# Patient Record
Sex: MALE | Race: WHITE
[De-identification: names, ages, dates, MRNs, and addresses within clinical notes are randomized per-mention and may not be internally consistent; named-entity substitution may affect disease eponyms.]

---

## 2019-01-11 ENCOUNTER — HOSPITAL ENCOUNTER (OUTPATIENT)
Dept: HOSPITAL 39 - RESP | Age: 69
End: 2019-01-11
Attending: ORTHOPAEDIC SURGERY
Payer: MEDICARE

## 2019-01-11 DIAGNOSIS — Z01.818: Primary | ICD-10-CM

## 2019-01-11 DIAGNOSIS — M17.12: ICD-10-CM

## 2019-01-15 NOTE — RAD
EXAM DESCRIPTION: 



Knee,Left Complete



CLINICAL HISTORY: 



OSTEOARTHRITIS



COMPARISON: 



None.



Findings:



4 standing views of the left knee show severe narrowing of the

medial tibiofemoral compartment with mild joint line osteophytes.

Flattening and sclerotic changes to the weightbearing articular

surface are seen. Mild lateral positioning of the tibia plateau

relative to the femoral condyles seen.

Mild posterior osteophytes of the patella are seen with mild

narrowing of the patellofemoral compartment. Mild increased

density in the suprapatellar bursa is seen. Vascular

calcifications are identified. Surgical clips in the medial soft

tissue from previous vein graft harvest are noted.



IMPRESSION: 



3 compartment osteoarthritic changes of the left knee are seen

with severe advanced osteoarthritic changes of the medial

tibiofemoral compartment.



Small left knee joint effusion.



Electronically signed by:  Lul Drew MD  1/15/2019 1:56 PM CST

Workstation: [id number]

## 2019-01-21 NOTE — HP
CHIEF COMPLAINT:  Left knee pain.



HISTORY OF PRESENT ILLNESS:  Mr. Ocasio is a 68-year-old male with a history of 
severe bilateral knee pain.  He has had this going on for years and it is 
getting progressively worse.  He has failed conservative measures and has had 
contralateral knee replacement.  He is requesting operative intervention.  After
discussing the risks, benefits and alternatives to operative therapy, he has 
given informed consent.  



PAST SURGICAL HISTORY:  

1.  Cardiac bypass.

2.  Total knee replacement.

3.  Umbilical herniorrhaphy.



MEDICATIONS:  

1.  Lasix.

2.  Potassium chloride.

3.  Carvedilol.

4.  Metolazone.

5.  Tylenol.



ALLERGIES:  NO KNOWN DRUG ALLERGIES.



CODE STATUS:  Full code.



IMMUNIZATIONS:  Up to date.



FAMILY HISTORY:  None pertinent to today's complaint.



SOCIAL HISTORY:  The patient does not smoke or use any illicit drugs.  He does 
drink on occasion.



REVIEW OF SYSTEMS:  Negative except as indicated in the History of Present 
Illness.



PHYSICAL EXAMINATION:



VITAL SIGNS:  Blood pressure 131/83.  Pulse 70.  Height 5'6".  Weight 204 
pounds.



MENTAL STATUS:  The patient is awake, alert, and is able to give a good history 
and participate in the physical.  The patient is oriented to person, place and 
time.



SKIN:  Normal tone and turgor.



HEENT:  Normocephalic, atraumatic.  Pupils equal, round and reactive.  Mucosal 
membranes are moist.



NECK:  Normal range of motion.  No thyromegaly, no lymphadenopathy. 



CHEST:  Normal respiratory excursion.



CARDIAC:  Regular rate and rhythm.  No murmurs, rubs or gallops.



MUSCULOSKELETAL:  He has full range of motion of bilateral upper extremities.  
He has intact sensation throughout the extremities.  There is no deformity and 
no crepitus with range of motion.  Sensation is intact.  They are warm and well 
perfused.  The right lower extremity shows full range of motion of the hip.  
Range of motion of the knee is from 0 to about 125.  Sensation is intact.  It is
warm and well perfused.  There is no varus/valgus or anterior/posterior laxity. 
The left lower extremity shows full range of motion in the hip.  He has range of
motion from 0 to about 125 degrees of the knee.  He has slight varus deformity 
of the knee.  Sensation is intact.  Strength is 5/5.  The extremity is warm and 
well perfused.  He has crepitus throughout his range of motion and tenderness 
diffusely to palpation.  



IMAGING:  X-rays show severe osteoarthritis with varus deformity.



ASSESSMENT:

1.  Osteoarthritis.



PLAN: The plan at this point is total knee arthroplasty.  We have discussed the 
risks, benefits, and alternatives to that and the patient has given informed 
consent.



#[id number]

[facility]

## 2019-01-22 ENCOUNTER — HOSPITAL ENCOUNTER (INPATIENT)
Dept: HOSPITAL 39 - AMB | Age: 69
LOS: 1 days | Discharge: HOME | DRG: 470 | End: 2019-01-23
Attending: NURSE PRACTITIONER | Admitting: ORTHOPAEDIC SURGERY
Payer: MEDICARE

## 2019-01-22 DIAGNOSIS — I25.10: ICD-10-CM

## 2019-01-22 DIAGNOSIS — I27.20: ICD-10-CM

## 2019-01-22 DIAGNOSIS — Z96.651: ICD-10-CM

## 2019-01-22 DIAGNOSIS — M17.12: Primary | ICD-10-CM

## 2019-01-22 DIAGNOSIS — Z95.1: ICD-10-CM

## 2019-01-22 DIAGNOSIS — I50.22: ICD-10-CM

## 2019-01-22 DIAGNOSIS — Z88.5: ICD-10-CM

## 2019-01-22 PROCEDURE — 0SRD0J9 REPLACEMENT OF LEFT KNEE JOINT WITH SYNTHETIC SUBSTITUTE, CEMENTED, OPEN APPROACH: ICD-10-PCS | Performed by: ORTHOPAEDIC SURGERY

## 2019-01-22 RX ADMIN — BUPIVACAINE HYDROCHLORIDE ONE ML: 5 INJECTION, SOLUTION EPIDURAL; INTRACAUDAL at 11:18

## 2019-01-22 RX ADMIN — BUPIVACAINE ONE MG: 13.3 INJECTION, SUSPENSION, LIPOSOMAL INFILTRATION at 11:18

## 2019-01-22 RX ADMIN — ENOXAPARIN SODIUM SCH MG: 30 INJECTION, SOLUTION INTRAVENOUS; SUBCUTANEOUS at 23:04

## 2019-01-22 RX ADMIN — CELECOXIB SCH MG: 100 CAPSULE ORAL at 17:59

## 2019-01-22 RX ADMIN — BUPIVACAINE HYDROCHLORIDE ONE ML: 5 INJECTION, SOLUTION EPIDURAL; INTRACAUDAL at 12:15

## 2019-01-22 RX ADMIN — VANCOMYCIN HYDROCHLORIDE ONE MG: 500 INJECTION, POWDER, LYOPHILIZED, FOR SOLUTION INTRAVENOUS at 12:15

## 2019-01-22 RX ADMIN — VANCOMYCIN HYDROCHLORIDE SCH MLS/HR: 500 INJECTION, POWDER, LYOPHILIZED, FOR SOLUTION INTRAVENOUS at 20:33

## 2019-01-22 RX ADMIN — CEFAZOLIN SODIUM SCH MLS/HR: 2 SOLUTION INTRAVENOUS at 18:15

## 2019-01-22 RX ADMIN — BUPIVACAINE ONE MG: 13.3 INJECTION, SUSPENSION, LIPOSOMAL INFILTRATION at 12:15

## 2019-01-22 RX ADMIN — VANCOMYCIN HYDROCHLORIDE ONE MG: 500 INJECTION, POWDER, LYOPHILIZED, FOR SOLUTION INTRAVENOUS at 11:19

## 2019-01-22 NOTE — RAD
EXAM DESCRIPTION: Knee,Left 2 or More Views



CLINICAL HISTORY: 68 years Male, TKA



TECHNIQUE:   2  views of the left knee were performed.



COMPARISON: None available.



FINDINGS: The visualized bones appear well mineralized. No acute

fracture or dislocation. Changes of left total knee

arthroplasty.. Soft tissue swelling and subcutaneous venous

emphysema is noted.



IMPRESSION:

Changes of left total knee arthroplasty with expected

postsurgical changes and the surrounding soft tissues.



Electronically signed by:  Joe Gonzalez MD  1/22/2019

3:57 PM [facility] Workstation: [id number]

## 2019-01-22 NOTE — CONS
DATE OF CONSULTATION:  01/22/19



SUPERVISING PHYSICIAN:  Gerard Simeon M.D. 



HISTORY OF PRESENT ILLNESS:  Mr. Ocasio is a 68 year-old male  patient 
that was admitted today for an elective left knee total arthroplasty by Dr. Jese Perez.  He has a long history of severe bilateral knee pain going on for 
multiple years that had progressively worsened.  He failed to have any relief of
conservative measures and has had a contralateral knee replacement.  He 
requested operative intervention for symptom control performed by Dr. Jese Perez.  He had no intraoperative complications and was seen in the immediate 
postoperative state in stable condition.  He was alert.



PAST MEDICAL HISTORY: 

1.   Coronary artery disease status post coronary artery bypass surgery.

2.   Compensated congestive heart failure, systolic with no current 
echocardiogram 

      available for review at time of admission.

3.   Pulmonary hypertension.



PAST SURGICAL HISTORY:  

1.   Cardiac bypass.

2.   Total right knee replacement.

3.   Umbilical hernia repair.

4.   Bilateral cataract extraction.



HOME MEDICATIONS: 

1.   Ibuprofen 800 mg daily.

2.   Metolazone 5 mg as needed for swelling.

3.   Aspirin 81 mg weekly.

4.   Coreg 3.125 mg at bedtime.

5.   Lasix 20 mg at bedtime.

6.   Lasix 40 mg in the morning.

7.   Potassium chloride 10 mEq daily.



ALLERGIES:   HYDROCODONE.



FAMILY HISTORY:  Noncontributory.



SOCIAL HISTORY:  The patient lives in [place], [place] and he is .  He is 
self employed.  He has no history of alcohol or tobacco abuse. 



REVIEW OF SYSTEMS: 

CONSTITUTIONAL:  Negative for any fevers, chills, general malaise, unintentional
weight loss.

HEENT:  Negative for headaches, vision changes, sore throat, nasal congestion.

CARDIOVASCULAR:  Negative for exertional dyspnea, chest pains, palpitations, 
syncopal episodes.

RESPIRATORY:  Negative for coughing, wheezing or shortness of breath.

GASTROINTESTINAL:  Negative for nausea, vomiting, diarrhea, constipation or 
abdominal pain.

GENITOURINARY:  No dysuria, hematuria or polyuria.

MUSCULOSKELETAL:  As noted in history of present illness, bilateral advanced 
arthritis and knee pain status post bilateral knee replacements.

NEUROLOGIC:  Negative for seizures, ataxia, vision changes, syncopal episodes, 
paresthesias or any neurological deficits.



PHYSICAL EXAMINATION: 



VITAL SIGNS:  Temperature 97.8, pulse 67, blood pressure 139/72, respirations 
20, satting 94% on nasal cannula at rest on 2 liters.  Admission weight is 90.7 
kg.



GENERAL:  The patient is alert, sitting in bed.  Appears to be in no acute 
distress.



HEENT:  Tympanic membranes are clear bilaterally.  Oropharynx is pink and moist 
without any lesions.



NECK:  Supple, non-tender.  Full range of motion.  No jugular venous distention.



CHEST:  Lungs are clear to auscultation bilaterally without any rhonchi, 
wheezing or rales.



HEART:  Regular rate and rhythm without appreciable murmurs, gallops, or rubs.



ABDOMEN:  Obese but soft, non-tender.  Positive bowel sounds.



EXTREMITIES:  Left knee has a bulky surgical dressing in place.  Distally pulses
are strong, capillary refill is brisk bilaterally.



NEUROLOGIC:  He is alert and oriented times three.



LABORATORY:  Postoperative labs are pending.



ASSESSMENT:

1.   Advanced osteoarthritis of the left knee failing to respond to outpatient 
treatment 

      measures status post total left knee arthroplasty for symptom control, 
immediate

      postoperative day 0.

2.   Coronary artery disease status post coronary artery bypass graft surgery.

3.   Compensated congestive heart failure, systolic with no echocardiogram 
available

      at time of admission.

4.   History of pulmonary hypertension.



PLAN:  Will follow the patient as he progresses through his postoperative phase 
and physical therapy rehabilitation efforts.  I will restart his medications as 
those have been updated and verified as appropriate to care.  He will be on DVT 
prophylaxis as per protocol.  At this point the plan is to continue with 
outpatient therapy in [place], [place] once he is able to discharge.  Will defer 
orthopedic care to Dr. Perez.  Will anticipate length of stay to be 2 to 3 days.  
Until the patient can discharge to outpatient management will continue to 
monitor and treat as needed.  Once discharged he will need followup with Dr. Perez
and his primary care physician who is Dr. Piper.



#[id number]

[facility]

## 2019-01-23 VITALS — OXYGEN SATURATION: 92 % | TEMPERATURE: 98.3 F | SYSTOLIC BLOOD PRESSURE: 142 MMHG | DIASTOLIC BLOOD PRESSURE: 65 MMHG

## 2019-01-23 RX ADMIN — CEFAZOLIN SODIUM SCH MLS/HR: 2 SOLUTION INTRAVENOUS at 11:44

## 2019-01-23 RX ADMIN — CELECOXIB SCH MG: 100 CAPSULE ORAL at 07:54

## 2019-01-23 RX ADMIN — CEFAZOLIN SODIUM SCH MLS/HR: 2 SOLUTION INTRAVENOUS at 02:06

## 2019-01-23 RX ADMIN — VANCOMYCIN HYDROCHLORIDE SCH MLS/HR: 500 INJECTION, POWDER, LYOPHILIZED, FOR SOLUTION INTRAVENOUS at 09:41

## 2019-01-23 RX ADMIN — ENOXAPARIN SODIUM SCH MG: 30 INJECTION, SOLUTION INTRAVENOUS; SUBCUTANEOUS at 11:45

## 2019-01-23 NOTE — DS
SUPERVISING PHYSICIAN:  Gerard Simeon M.D.



DISCHARGE DIAGNOSIS: 

1.   Advanced osteoarthritis of the left knee having failed to respond to 
outpatient treatment 

      measures status post total left knee arthroplasty performed per Dr. Jese Perez,

      orthopedic surgeon.  Postoperative day 1.

2.   Coronary artery disease status post coronary artery bypass graft surgery.

3.   Compensated congestive heart failure, systolic in etiology with no 
echocardiogram 

      available for review.

4.   History of pulmonary hypertension.



HISTORY OF PRESENT ILLNESS:  This is a 68 year-old male patient that lives in 
[place].  He was admitted yesterday for elective left total knee arthroplasty per 
Dr. Jese Perez, orthopedic surgeon.  He has a long history of severe bilateral 
knee pain that has been going on for multiple years.  It has progressively 
worsened.  He failed conservative measures and has had a right knee replacement 
in the past.  He was seen postoperatively after his left total knee 
arthroplasty.  He had no problems intraoperatively and was admitted to the 
Medical/Surgical floor postoperatively yesterday.



HOSPITAL COURSE:  The patient used minimal pain medications while on the 
morphine PCA.  He was transitioned over to Tramadol.  He met his goals today 
with physical therapy.  He has requested that he be discharged home.  After 
seeing Dr. Perez as per Physical Therapy it has been recommended that he can be 
discharged home today.



DISCHARGE PLAN:  The patient will be discharged home in stable condition.  He 
has a followup tomorrow with Dr. Jese Perez at 9:45 AM for dressing changes.  
He will be at [place] outpatient physical therapy.  He is to resume his previous 
diet as well as his previous medications.  He will have Tramadol for pain as 
well as 10 more days of Xarelto starting tomorrow.  He is to return to the 
hospital or call Dr. Perez's office for any problems or complications.  



DISCHARGE MEDICATIONS:

1.   Coreg.

2.   Furosemide.

3.   Metolazone.

4.   Potassium chloride.

5.   Aspirin.

6.   Ibuprofen.

7.   Xarelto.



#[id number]

[facility]

## 2019-01-25 NOTE — OP
DATE OF PROCEDURE:  01/22/19



PREOPERATIVE DIAGNOSIS: 

1.  Left knee osteoarthritis.



POSTOPERATIVE DIAGNOSIS: 

1.  Left knee osteoarthritis.



PROCEDURE: 

1.  Total knee arthroplasty.



SURGEON:  Jese Perez MD.



ASSISTANT:  Abhi Rosario CST, SA-C.



ANESTHESIA:  General anesthesia.



COMPLICATIONS:  None.



FINDINGS:  Severe arthritis.



INDICATION:  Mr. Ocasio has a long history of knee pain for which he has failed 
conservative measures.  He has requested operative intervention.  After 
discussing the risks, benefits and alternatives to that, the patient has given 
informed consent for total knee arthroplasty.



PROCEDURE:  The patient was brought to the Operating Room and placed in supine 
position.  General anesthesia was induced and the patient's leg was sterilely 
prepped and draped.  Following prepping and draping, the distal femur was 
exposed and using an intramedullary guide, the distal femoral cut was made.  The
appropriate sized cutting block was measured, pinned into place, and the 
anterior, posterior, and chamfer cuts were made.  The ACL was transected and the
tibia was subluxed.  Both the medial and lateral menisci were removed.  An 
intramedullary guide was used to make the proximal tibial cut.  The appropriate 
sized base plate was placed and a trial polyethylene was placed.  The trial 
femur was placed, the knee was reduced, and the knee was taken through a range 
of motion.  The knee was stable in anterior, posterior, varus and valgus stress.
 The patella tracked anatomically without evidence of subluxation or 
dislocation.  After trialing, the trial components were removed and the bony 
surfaces were thoroughly irrigated with saline.  Following irrigation, the 
surfaces were dried and the final components were cemented into place.  The 
excess cement was removed and the remaining cement was allowed to cure.  The 
knee was again taken through a range of motion to confirm stability.  The wound 
was then irrigated with saline and closure was performed using PDS to 
approximate the arthrotomy followed by closure of the subcutaneous tissues with 
a combination of running and interrupted Monocryl sutures.  Sterile dressing was
placed.  The patient was awoken from anesthesia and taken to Recovery.



POSTOPERATIVE PLAN:  The patient will be weight-bearing as tolerated on 
postoperative day 1.  



COMPONENTS:  [place] [employer] knee, size 4 femur, size 4 tibia, 9 mm insert.



#[id number]

[facility]

## 2019-01-25 NOTE — PN
DATE:  01/22/19



POSTOPERATIVE CHECK



SUBJECTIVE:  Mr. Ocasio is doing well.  He has pain that is well controlled.



OBJECTIVE:  Afebrile.  Vital signs stable.  Dressing is clean, dry and intact.



ASSESSMENT:  Status post total knee arthroplasty.



PLAN:  The plan at this point is for  to begin weightbearing as tolerated on 
postoperative day 1.



#[id number]

[facility]

## 2019-01-25 NOTE — PN
DATE:  01/23/19



SUBJECTIVE:  Mr. Ocasio is doing well.  He has been up walking the friedman without 
assistance.  



OBJECTIVE:  Afebrile.  Vital signs stable.  Dressing is clean, dry and intact.



ASSESSMENT:  Status post total knee arthroplasty.



PLAN:  Mr. Ocasio is requesting to be discharged today.  He is doing very well 
on his own and he understands the outpatient physical therapy regimen as he has 
recently had his contralateral knee replaced by us.  At this point, I think that
is reasonable and he is going to be discharged with followup in one day for 
dressing change.  He has been instructed to return immediately should any change
in his condition occur.  



#[id number]

[facility]